# Patient Record
Sex: MALE | Race: WHITE
[De-identification: names, ages, dates, MRNs, and addresses within clinical notes are randomized per-mention and may not be internally consistent; named-entity substitution may affect disease eponyms.]

---

## 2018-12-20 ENCOUNTER — HOSPITAL ENCOUNTER (OUTPATIENT)
Dept: HOSPITAL 35 - NUC | Age: 73
End: 2018-12-20
Payer: COMMERCIAL

## 2018-12-20 DIAGNOSIS — R06.09: Primary | ICD-10-CM

## 2018-12-20 DIAGNOSIS — R07.89: ICD-10-CM

## 2019-01-17 ENCOUNTER — HOSPITAL ENCOUNTER (OUTPATIENT)
Dept: HOSPITAL 96 - M.CL | Age: 74
Setting detail: OBSERVATION
LOS: 1 days | Discharge: HOME | End: 2019-01-18
Attending: INTERNAL MEDICINE | Admitting: INTERNAL MEDICINE
Payer: COMMERCIAL

## 2019-01-17 VITALS — SYSTOLIC BLOOD PRESSURE: 124 MMHG | DIASTOLIC BLOOD PRESSURE: 69 MMHG

## 2019-01-17 VITALS — DIASTOLIC BLOOD PRESSURE: 78 MMHG | SYSTOLIC BLOOD PRESSURE: 134 MMHG

## 2019-01-17 VITALS — SYSTOLIC BLOOD PRESSURE: 147 MMHG | DIASTOLIC BLOOD PRESSURE: 82 MMHG

## 2019-01-17 VITALS — DIASTOLIC BLOOD PRESSURE: 82 MMHG | SYSTOLIC BLOOD PRESSURE: 147 MMHG

## 2019-01-17 VITALS — SYSTOLIC BLOOD PRESSURE: 159 MMHG | DIASTOLIC BLOOD PRESSURE: 84 MMHG

## 2019-01-17 VITALS — DIASTOLIC BLOOD PRESSURE: 74 MMHG | SYSTOLIC BLOOD PRESSURE: 127 MMHG

## 2019-01-17 VITALS — SYSTOLIC BLOOD PRESSURE: 143 MMHG | DIASTOLIC BLOOD PRESSURE: 81 MMHG

## 2019-01-17 VITALS — SYSTOLIC BLOOD PRESSURE: 140 MMHG | DIASTOLIC BLOOD PRESSURE: 67 MMHG

## 2019-01-17 VITALS — SYSTOLIC BLOOD PRESSURE: 134 MMHG | DIASTOLIC BLOOD PRESSURE: 78 MMHG

## 2019-01-17 VITALS — SYSTOLIC BLOOD PRESSURE: 147 MMHG | DIASTOLIC BLOOD PRESSURE: 81 MMHG

## 2019-01-17 VITALS — DIASTOLIC BLOOD PRESSURE: 80 MMHG | SYSTOLIC BLOOD PRESSURE: 142 MMHG

## 2019-01-17 VITALS — SYSTOLIC BLOOD PRESSURE: 142 MMHG | DIASTOLIC BLOOD PRESSURE: 77 MMHG

## 2019-01-17 VITALS — WEIGHT: 230 LBS | BODY MASS INDEX: 31.15 KG/M2 | HEIGHT: 72.01 IN

## 2019-01-17 VITALS — DIASTOLIC BLOOD PRESSURE: 38 MMHG | SYSTOLIC BLOOD PRESSURE: 127 MMHG

## 2019-01-17 VITALS — DIASTOLIC BLOOD PRESSURE: 81 MMHG | SYSTOLIC BLOOD PRESSURE: 139 MMHG

## 2019-01-17 VITALS — DIASTOLIC BLOOD PRESSURE: 81 MMHG | SYSTOLIC BLOOD PRESSURE: 152 MMHG

## 2019-01-17 VITALS — DIASTOLIC BLOOD PRESSURE: 79 MMHG | SYSTOLIC BLOOD PRESSURE: 138 MMHG

## 2019-01-17 DIAGNOSIS — Z79.01: ICD-10-CM

## 2019-01-17 DIAGNOSIS — Z79.82: ICD-10-CM

## 2019-01-17 DIAGNOSIS — Z87.891: ICD-10-CM

## 2019-01-17 DIAGNOSIS — I10: ICD-10-CM

## 2019-01-17 DIAGNOSIS — I48.0: ICD-10-CM

## 2019-01-17 DIAGNOSIS — I25.110: Primary | ICD-10-CM

## 2019-01-17 DIAGNOSIS — E78.00: ICD-10-CM

## 2019-01-17 DIAGNOSIS — Z79.899: ICD-10-CM

## 2019-01-17 DIAGNOSIS — E66.09: ICD-10-CM

## 2019-01-17 LAB
ALBUMIN SERPL-MCNC: 4.1 G/DL (ref 3.4–5)
ALP SERPL-CCNC: 92 U/L (ref 46–116)
ALT SERPL-CCNC: 42 U/L (ref 30–65)
ANION GAP SERPL CALC-SCNC: 8 MMOL/L (ref 7–16)
APTT BLD: 29.8 SECONDS (ref 25–31.3)
AST SERPL-CCNC: 16 U/L (ref 15–37)
BILIRUB SERPL-MCNC: 0.9 MG/DL
BUN SERPL-MCNC: 13 MG/DL (ref 7–18)
CALCIUM SERPL-MCNC: 8.9 MG/DL (ref 8.5–10.1)
CHLORIDE SERPL-SCNC: 100 MMOL/L (ref 98–107)
CHOLEST SERPL-MCNC: 145 MG/DL (ref ?–200)
CO2 SERPL-SCNC: 27 MMOL/L (ref 21–32)
CREAT SERPL-MCNC: 1 MG/DL (ref 0.6–1.3)
GLUCOSE SERPL-MCNC: 111 MG/DL (ref 70–99)
HCT VFR BLD CALC: 47.8 % (ref 42–52)
HDLC SERPL-MCNC: 55 MG/DL (ref 40–?)
HGB BLD-MCNC: 16.5 GM/DL (ref 14–18)
INR PPP: 1.1
LDLC SERPL-MCNC: 65 MG/DL (ref ?–100)
MCH RBC QN AUTO: 32.2 PG (ref 26–34)
MCHC RBC AUTO-ENTMCNC: 34.5 G/DL (ref 28–37)
MCV RBC: 93.2 FL (ref 80–100)
MPV: 8.3 FL. (ref 7.2–11.1)
PLATELET COUNT*: 229 THOU/UL (ref 150–400)
POTASSIUM SERPL-SCNC: 3.9 MMOL/L (ref 3.5–5.1)
PROT SERPL-MCNC: 7.4 G/DL (ref 6.4–8.2)
PROTHROMBIN TIME: 11.1 SECONDS (ref 9.2–11.5)
RBC # BLD AUTO: 5.13 MIL/UL (ref 4.5–6)
RDW-CV: 13.9 % (ref 10.5–14.5)
SODIUM SERPL-SCNC: 135 MMOL/L (ref 136–145)
TC:HDL: 2.6 RATIO
TRIGL SERPL-MCNC: 127 MG/DL (ref ?–150)
VLDLC SERPL CALC-MCNC: 25 MG/DL (ref ?–40)
WBC # BLD AUTO: 10.2 THOU/UL (ref 4–11)

## 2019-01-17 NOTE — NUR
PT ARRIVED ON THE UNIT AT APPROX 1615, WIFE AT BEDSIDE, CARDIAC MONITOR
TRACING SR WITH A FIRST DEGREE BLOCK. PT HAS CATH SITE AT RIGHT GROIN, SITE IS
SOFT WITH CLEAN DRESSING. PT WAS EDUCATED ON KEEPING LEG STRAIGHT AND LYING
FLAT. PT ALSO HAS SITE AT RIGHT WRIST THAT WAS ACCESSED BUT DR ROWLEY FELT
THE SITE WAS NOT RELIABLE AND WENT THROUGH THE GROIN. LS CTA, BS ACTIVE X4,
PEDAL PULSES NORMAL. PT ORIENTED TO ROOM AND CALL LIGHT, POST CATH VS TO BE
COMPLETED. PT TO LAY FLAT UNTIL 2100.

## 2019-01-17 NOTE — CARD
[address]  [address]                    CARDIAC CATH REPORT           
_______________________________________________________________________________
 
Name:       ANGELLA HARPER                     Room:           [address] 
MUrielRUriel#:  [id number]      Account #:      [id number]  
Admission:  01/17/19     Attend Phys:    Pavel Garcia MD
Discharge:               Date of Birth:  [birth year]  
         Report #: [id number]
                                                                     [id number]
_______________________________________________________________________________
THIS REPORT FOR:  //name//                      
 
 
--------------- APPROVED REPORT --------------
 
 
Study performed:  01/17/2019 13:54:46
 
Patient Details
The patient is a 73 year-old male
 
Event Personnel
Pavel Garcia  Cardiologist, Tamia Alvarado RN Circulator, 
Thanh Bar (R) Monitor, Sue Antunez RTR Scrub, Sue Antunez RTR Scrub, Ignacio Joaquin  Interventional Cardiologist, David Nicole  Monitor
 
Procedures Performed
Left Heart Cath w/or w/o Coronaries [id number] [facility] LYN Place w/wo Plasty 
Single RCA [id number] Hemostasis w/ Angioseal
 
Indication
Positive stress test
 
Risk Factors
Obesity, Hypercholesterolemia
 
Admission/Lab Medications/Medications given during procedure
Aspirin, Platelet Aff. Inhib., Angiomax bolus and infusion
 
Procedure Narrative
The patient was brought electively to the Cardiac Catheterization 
Laboratory and was prepped and draped in a sterile manner. The right 
wrist was infiltrated with 2% Lidocaine subcutaneous anesthesia. A 
Bancroft 6 FR sheath was inserted into the RFA. Coronary angiography 
was performed using coronary diagnostic catheters. The right coronary 
system was accessed and visualized with a TIG Diagnostic catheter. 
The left coronary system was accessed and visualized with a TIG 
Diagnostic catheter. The left ventricle was accessed and visualized 
with a Diagnostic catheter. Left ventricular/Aortic Valve gradient 
assessed via catheter pullback. Pre-demployment femoral angiogram was 
performed . The patient tolerated the procedure well and there were 
no complications associated with the procedure. There was no 
hematoma.
 
Intraoperative Conscious Sedation
 
 
 
[place] [address]                    CARDIAC CATH REPORT           
_______________________________________________________________________________
 
Name:       ANGELLA HARPER                     Room:           [address] 
M.R.#:  [id number]      Account #:      [id number]  
Admission:  01/17/19     Attend Phys:    Pavel Garcia MD
Discharge:               Date of Birth:  [birth year]  
         Report #: [id number]
                                                                     [id number]
_______________________________________________________________________________
Fentanyl  75 mcg   
 
Fluoro Time:    30.3 minutes    
Dose:     DAP 967244 cGycm2  140 mGy  
Contrast Type and Amount:  Visipaque 320 ml   
 
Diagnostic Cath
Left Main 0% narrowing
LAD  20% mid LAD narrowing
Circumflex 30% mid circumflex narrowing
Right Coronary 95% mid right coronary stenosis surrounding the acute 
margin
 
Left Ventriculography
The left ventricle is normal in size with normal contractility. The 
left ventricular ejection fraction is estimated to be 65-70%. Left 
ventricular wall motion abnormalities are not present. There is no 
mitral insufficiency.
 
Hemodynamics
The aortic pressure is 123/63 mmHg with a mean of 89 mmHg. The left 
ventricular pressure is 102/-1 mmHg with a mean of mmHg. The left 
ventricular end diastolic pressure is 10 mmHg. There was no gradient 
across the aortic valve upon pullback. 
 
PCI Technique Lesion
Anticoagulation was achieved with Angiomax. Percutaneous coronary 
intervention was performed on the mid right coronary artery. The 
lesion stenosis prior to intervention was 95% with SAUL 2 flow. A 6F 
3DRC Guide Catheter was used to engage the ostium. A IG: BMW 190cm 
Interventional Guidewire was used to cross the lesion.
 
BALLOON DILATION
A Balloon catheter NC Trek RX 3.0 X 12 was inserted and inflated up 
to 16.00atm for 9seconds.
 
STENT DEPLOYMENT
A stent Xience Precious 2.5X28mm was inserted and inflated up to 
20.00atm for 6seconds.
 
POST STENT DEPLOYMENT BALLOON DILATION
A Balloon catheter NC Trek RX 3.0 X 12 was inserted and inflated up 
to 14-20atm for 10seconds.
 
Final angiography reveals 0 % stenosis with SAUL 3 
flow.
 
 
 
[place] [address]                    CARDIAC CATH REPORT           
_______________________________________________________________________________
 
Name:       ANGELLA HARPER                     Room:           [address]#:  [id number]      Account #:      [id number]  
Admission:  01/17/19     Attend Phys:    Pavel Garcia MD
Discharge:               Date of Birth:  [birth year]  
         Report #: [id number]
                                                                     [id number]
_______________________________________________________________________________
 
COMMENTS
The severely stenosed mid right coronary artery was dilated with a 
1.2 x 12 and subsequently a 2.0 x 12 balloons and prior to stent 
deployment
 
Conclusion
#1 significant coronary artery disease characterized by the 
following:
 
A 95% irregular stenosis of the mid right coronary artery surrounding 
the acute margin
 
B 20% mid LAD narrowing
 
C 30% midcircumflex narrowing
 
#2 normal left ventricular systolic function, estimated ejection 
fraction being 65-70%
 
#3 normal left-sided hemodynamic study
 
#4 successful percutaneous coronary intervention with deployment of a 
drug-eluting stent at the site of 95% mid right coronary stenosis 
with 0% residual narrowing and SAUL-3 flow to the distal vessel 
 
Recommendations
Cardiac Risk Reduction Program
Aggressive Medical Therapy
 
Medications Administered
Aspirin (any) 
Prasugrel 
 
Diagnostic Cath Approved by: Pavel Garcia MD        Date/Time: 
01/17/2019 16:51:55
 
 
 
 
 
 
 
 
<ELECTRONICALLY SIGNED>
                                        By:  Ignacio Joaquin MD, FACC     
01/17/19 1652
D: 01/17/19 1652_______________________________________
T: 01/17/19 1652Ignacio Joaquin MD, FACC        /INF

## 2019-01-17 NOTE — H
[address]  [address]                    HISTORY AND PHYSICAL          
_______________________________________________________________________________
 
Name:       ANGELLA HARPER                     Room:           [address] [place] PATIÑO#:  [id number]      Account #:      [id number]  
Admission:  01/17/19     Attend Phys:    Pavel Garcia MD
Discharge:  01/18/19     Date of Birth:  [birth year]  
         Report #: [id number]
                                                                                
_______________________________________________________________________________
THIS REPORT FOR:  //name//                      
 
Please refer to the History and Physical performed in the physician's office.
 
 
 
 
 
 
 
 
 
 
 
 
 
 
 
 
 
 
 
 
 
 
 
 
 
 
 
 
 
 
 
 
 
 
 
 
 
 
 
 
 
                       
                                        By:                                
                 
D: 01/17/19     _______________________________________
T: 01/22/19 0658Medical Records Staff YOSHI       /AL

## 2019-01-18 VITALS — SYSTOLIC BLOOD PRESSURE: 135 MMHG | DIASTOLIC BLOOD PRESSURE: 71 MMHG

## 2019-01-18 VITALS — SYSTOLIC BLOOD PRESSURE: 129 MMHG | DIASTOLIC BLOOD PRESSURE: 73 MMHG

## 2019-01-18 VITALS — DIASTOLIC BLOOD PRESSURE: 74 MMHG | SYSTOLIC BLOOD PRESSURE: 141 MMHG

## 2019-01-18 VITALS — SYSTOLIC BLOOD PRESSURE: 141 MMHG | DIASTOLIC BLOOD PRESSURE: 74 MMHG

## 2019-01-18 VITALS — SYSTOLIC BLOOD PRESSURE: 147 MMHG | DIASTOLIC BLOOD PRESSURE: 82 MMHG

## 2019-01-18 LAB
ALBUMIN SERPL-MCNC: 3.1 G/DL (ref 3.4–5)
ALP SERPL-CCNC: 72 U/L (ref 46–116)
ALT SERPL-CCNC: 32 U/L (ref 30–65)
ANION GAP SERPL CALC-SCNC: 9 MMOL/L (ref 7–16)
AST SERPL-CCNC: 16 U/L (ref 15–37)
BILIRUB SERPL-MCNC: 0.9 MG/DL
BUN SERPL-MCNC: 11 MG/DL (ref 7–18)
CALCIUM SERPL-MCNC: 8.3 MG/DL (ref 8.5–10.1)
CHLORIDE SERPL-SCNC: 103 MMOL/L (ref 98–107)
CO2 SERPL-SCNC: 25 MMOL/L (ref 21–32)
CREAT SERPL-MCNC: 0.8 MG/DL (ref 0.6–1.3)
GLUCOSE SERPL-MCNC: 100 MG/DL (ref 70–99)
HCT VFR BLD CALC: 41 % (ref 42–52)
HGB BLD-MCNC: 14.3 GM/DL (ref 14–18)
MCH RBC QN AUTO: 32.2 PG (ref 26–34)
MCHC RBC AUTO-ENTMCNC: 34.8 G/DL (ref 28–37)
MCV RBC: 92.4 FL (ref 80–100)
MPV: 8.5 FL. (ref 7.2–11.1)
PLATELET COUNT*: 179 THOU/UL (ref 150–400)
POTASSIUM SERPL-SCNC: 3.8 MMOL/L (ref 3.5–5.1)
PROT SERPL-MCNC: 5.8 G/DL (ref 6.4–8.2)
RBC # BLD AUTO: 4.44 MIL/UL (ref 4.5–6)
RDW-CV: 14 % (ref 10.5–14.5)
SODIUM SERPL-SCNC: 137 MMOL/L (ref 136–145)
TROPONIN-I LEVEL: 0.54 NG/ML (ref ?–0.06)
WBC # BLD AUTO: 8 THOU/UL (ref 4–11)

## 2019-01-18 NOTE — D
[address]  [address]                    DISCHARGE SUMMARY             
_______________________________________________________________________________
 
Name:       ANGELLA HARPER                     Room:           [address] [place] PATIÑO#:  [id number]      Account #:      [id number]  
Admission:  01/17/19     Attend Phys:    Pavel Garcia MD
Discharge:  01/18/19     Date of Birth:  [birth year]  
         Report #: [id number]
                                                                     [id number]  
_______________________________________________________________________________
THIS REPORT FOR:  //name//                      
 
CC: Pavel Vega
 
DATE OF SERVICE:  01/18/2019
 
 
FINAL DIAGNOSES:
1.  Unstable angina.
2.  Coronary artery disease, status post placement of a drug-eluting stent in
the mid right coronary artery 99% stenosis.
3.  Normal left ventricular function.
4.  Atrial fibrillation, paroxysmal.
 
HOSPITAL SUMMARY:  The patient had an outpatient stress test which was ordered
on complaints of chest discomfort, which was abnormal.  He underwent cardiac
catheterization.  Initially, we performed the diagnostic procedure through the
right radial artery, but in order to more appropriately seat the guide catheter,
he had to be transitioned to a right femoral artery system.  This procedure went
well and he had placement of a single drug-eluting stent in the mid RCA, it was
a 28 mm long stent.  His anticoagulation for his paroxysmal atrial fibrillation
was held for the procedure.  He will be discharged on Effient and aspirin.  We
elected to hold his anticoagulation for AFib.  He had been in sinus rhythm and
is maintained in sinus rhythm on propafenone.
 
FINAL DISCHARGE MEDICATIONS:  Aspirin 81 mg daily, prasugrel 10 mg daily,
Cardizem 120 mg daily, propafenone and Toprol-XL 50 mg daily.
 
His LDL was 65, so he will be discharged on atorvastatin 40 mg daily.
 
 
 
 
 
 
 
 
 
 
 
 
 
<ELECTRONICALLY SIGNED>
                                        By:  Pavel Garcia MD, FACC   
01/18/19     1342
D: 01/18/19 0949_______________________________________
T: 01/18/19 1035Pavel Garcia MD, FACC      /nt

## 2019-01-18 NOTE — EKG
[place] [address]
Phone:  [phone]                     ELECTROCARDIOGRAM REPORT      
_______________________________________________________________________________
 
Name:       ANGELLA HARPER                     Room:           [address]R.#:  [id number]      Account #:      [id number]  
Admission:  19     Attend Phys:    Pavel Garcia MD
Discharge:               Date of Birth:  [birth year]  
         Report #: [id number]
    [id number]
_______________________________________________________________________________
THIS REPORT FOR:  //name//                      
 
                          [facility]
                                       
Test Date:    2019               Test Time:    04:02:33
Pat Name:     ANGELLASONIA HARPER                 Department:   
Patient ID:   [id number]            Room:         [facility]
Gender:       M                        Technician:   XF
:          1945               Requested By: Ignacio Joaquin
Order Number: [id number]    Brandan MD:   Ignacio Joaquin
                                 Measurements
Intervals                              Axis          
Rate:         77                       P:            61
NJ:           198                      QRS:          -8
QRSD:         98                       T:            41
QT:           388                                    
QTc:          440                                    
                           Interpretive Statements
Sinus rhythm
Inferior infarct, old
Consider anterolateral infarct
Baseline wander in lead(s) III,aVF
No previous ECG available for comparison
 
Electronically Signed On 2019 10:25:40 CST by Ignacio Joaquin
[email or web address]
 
 
 
 
 
 
 
 
 
 
 
 
 
 
 
 
 
  <ELECTRONICALLY SIGNED>
                                           By: Ignacio Joaquin MD, [facility]     
  19     1025
D: 19 040   _____________________________________
T: 19 040   Ignacio Joaquin MD, FACC       /EPI

## 2019-01-18 NOTE — NUR
VSS, ASSUMED CARE OF PT IN THE AM, ASSESSMENT PERFORMED AND CHARTRED, FALLS
PRECAUTIONS IN PLACE AND CALL LIGHT IN RECAH, PT IS A&O4 AND DENIES ANY PAIN
AND IS ON RA, TRACING AFIB ON THE MONITOR, PT CATH SIT ON HIS RIGHT WRIST AND
GRION, ARE C/D/I. PT GOAL IS TO D/C TO HOME, AT THIS TIME I HAVE COMPLETED
HOURLY ROUNDS PROVITED D/C SCRIPTS, AND D/C PAPERS, GAVE D/C INSTRUCTIONS TOOK
OUT IV AND TELE MONITOR, PT DENIES ANY QUESTIONS OR CONCERNS AT TIME PT WALKED
OUT WITH SPOUCE TO CAR.

## 2019-01-18 NOTE — EKG
[place] [address]
Phone:  [phone]                     ELECTROCARDIOGRAM REPORT      
_______________________________________________________________________________
 
Name:       ANGELLA HARPER                     Room:           [address]R.#:  [id number]      Account #:      [id number]  
Admission:  19     Attend Phys:    Pavel Garcia MD
Discharge:               Date of Birth:  [birth year]  
         Report #: [id number]
    [id number]
_______________________________________________________________________________
THIS REPORT FOR:  //name//                      
 
                          [facility]
                                       
Test Date:    2019               Test Time:    16:42:51
Pat Name:     ANGELLA MARROQUINY                 Department:   
Patient ID:   [id number]            Room:         [facility]
Gender:       M                        Technician:   [facility]
:          1945               Requested By: Ignacio Joaquin
Order Number: [id number]    Brandan MD:   Ignacio Joaquin
                                 Measurements
Intervals                              Axis          
Rate:         74                       P:            52
CT:           218                      QRS:          14
QRSD:         89                       T:            34
QT:           398                                    
QTc:          442                                    
                           Interpretive Statements
Sinus rhythm
Borderline prolonged CT interval
Low voltage, precordial leads
Consider anterior infarct
No previous ECG available for comparison
 
Electronically Signed On 2019 10:24:15 CST by Ignacio Joaquin
[email or web address]
 
 
 
 
 
 
 
 
 
 
 
 
 
 
 
 
 
  <ELECTRONICALLY SIGNED>
                                           By: Ignacio Joaquin MD, [facility]     
  19     1024
D: 19   _____________________________________
T: 19   Ignacio Joaquin MD, FAC       /EPI

## 2019-01-18 NOTE — NUR
PATIENT RESTED IN BED, NO ACUTE CHANGES. PATIENT DID NOT SHOW SIGNS OF
BLEEDING OR DISTRESS. PATIENT DID NOT COMPLAIN OF PAIN. CALL LIGHT WITH IN
REACH, HOURLY ROUNDING OBSERVED, BED ALARM ON.

## 2019-01-18 NOTE — EKG
[place] [address]
Phone:  [phone]                     ELECTROCARDIOGRAM REPORT      
_______________________________________________________________________________
 
Name:       ANGELLA HARPER                     [address]  [id number]      Account #:      [id number]  
Admission:  19     Attend Phys:    Pavel Garcia MD
Discharge:               Date of Birth:  [birth year]  
         Report #: [id number]
    [id number]
_______________________________________________________________________________
THIS REPORT FOR:  //name//                      
 
                          [facility]
                                       
Test Date:    2019               Test Time:    12:53:06
Pat Name:     ANGELLA HARPER                 Department:   
Patient ID:   [id number]            Room:         [facility]
Gender:                               Technician:   [facility]
:          1945               Requested By: Pavel Garcia
Order Number: [id number]    Brandan MD:   Ignacio Joaquin
                                 Measurements
Intervals                              Axis          
Rate:         69                       P:            43
AK:           226                      QRS:          12
QRSD:         91                       T:            34
QT:           401                                    
QTc:          430                                    
                           Interpretive Statements
Sinus rhythm
Prolonged AK interval
Low voltage, precordial leads
No previous ECG available for comparison
 
Electronically Signed On 2019 10:22:06 CST by Ignacio Joaquin
[email or web address]
 
 
 
 
 
 
 
 
 
 
 
 
 
 
 
 
 
 
  <ELECTRONICALLY SIGNED>
                                           By: Ignacio Joaquin MD, [facility]     
  19     1022
D: 19 1253   _____________________________________
T: 19 1253   Ignacio Joaquin MD, FACC       /EPI